# Patient Record
Sex: FEMALE
[De-identification: names, ages, dates, MRNs, and addresses within clinical notes are randomized per-mention and may not be internally consistent; named-entity substitution may affect disease eponyms.]

---

## 2017-06-27 ENCOUNTER — RECORDS - HEALTHEAST (OUTPATIENT)
Dept: ADMINISTRATIVE | Facility: OTHER | Age: 82
End: 2017-06-27

## 2017-08-02 ENCOUNTER — RECORDS - HEALTHEAST (OUTPATIENT)
Dept: ADMINISTRATIVE | Facility: OTHER | Age: 82
End: 2017-08-02

## 2017-08-30 ENCOUNTER — OFFICE VISIT - HEALTHEAST (OUTPATIENT)
Dept: INTERNAL MEDICINE | Facility: CLINIC | Age: 82
End: 2017-08-30

## 2017-08-30 DIAGNOSIS — Z00.00 ROUTINE GENERAL MEDICAL EXAMINATION AT A HEALTH CARE FACILITY: ICD-10-CM

## 2017-08-30 DIAGNOSIS — N83.209 RUPTURED OVARIAN CYST: ICD-10-CM

## 2017-08-30 DIAGNOSIS — N28.89 RENAL MASS, RIGHT: ICD-10-CM

## 2017-08-30 DIAGNOSIS — E78.00 PURE HYPERCHOLESTEROLEMIA: ICD-10-CM

## 2017-08-30 DIAGNOSIS — Z23 NEED FOR VACCINATION: ICD-10-CM

## 2017-08-30 LAB
CHOLEST SERPL-MCNC: 223 MG/DL
FASTING STATUS PATIENT QL REPORTED: YES
HDLC SERPL-MCNC: 70 MG/DL
LDLC SERPL CALC-MCNC: 136 MG/DL
TRIGL SERPL-MCNC: 87 MG/DL

## 2017-08-30 ASSESSMENT — MIFFLIN-ST. JEOR: SCORE: 778.61

## 2017-09-01 ENCOUNTER — RECORDS - HEALTHEAST (OUTPATIENT)
Dept: ADMINISTRATIVE | Facility: OTHER | Age: 82
End: 2017-09-01

## 2017-09-05 ENCOUNTER — COMMUNICATION - HEALTHEAST (OUTPATIENT)
Dept: INTERNAL MEDICINE | Facility: CLINIC | Age: 82
End: 2017-09-05

## 2017-09-11 ENCOUNTER — COMMUNICATION - HEALTHEAST (OUTPATIENT)
Dept: INTERNAL MEDICINE | Facility: CLINIC | Age: 82
End: 2017-09-11

## 2017-09-11 DIAGNOSIS — R10.31 RIGHT LOWER QUADRANT ABDOMINAL PAIN: ICD-10-CM

## 2017-09-11 DIAGNOSIS — N28.89 RENAL MASS, RIGHT: ICD-10-CM

## 2017-09-11 DIAGNOSIS — L30.8 OTHER ECZEMA: ICD-10-CM

## 2017-09-11 DIAGNOSIS — Z00.00 ROUTINE GENERAL MEDICAL EXAMINATION AT A HEALTH CARE FACILITY: ICD-10-CM

## 2017-09-11 DIAGNOSIS — N83.209 RUPTURED OVARIAN CYST: ICD-10-CM

## 2017-09-11 DIAGNOSIS — E78.00 PURE HYPERCHOLESTEROLEMIA: ICD-10-CM

## 2017-09-11 DIAGNOSIS — Z00.00 HEALTHCARE MAINTENANCE: ICD-10-CM

## 2017-10-13 ENCOUNTER — RECORDS - HEALTHEAST (OUTPATIENT)
Dept: ADMINISTRATIVE | Facility: OTHER | Age: 82
End: 2017-10-13

## 2018-08-06 ENCOUNTER — RECORDS - HEALTHEAST (OUTPATIENT)
Dept: ADMINISTRATIVE | Facility: OTHER | Age: 83
End: 2018-08-06

## 2018-08-31 ENCOUNTER — AMBULATORY - HEALTHEAST (OUTPATIENT)
Dept: INTERNAL MEDICINE | Facility: CLINIC | Age: 83
End: 2018-08-31

## 2018-09-04 ENCOUNTER — COMMUNICATION - HEALTHEAST (OUTPATIENT)
Dept: INTERNAL MEDICINE | Facility: CLINIC | Age: 83
End: 2018-09-04

## 2018-09-04 ENCOUNTER — OFFICE VISIT - HEALTHEAST (OUTPATIENT)
Dept: INTERNAL MEDICINE | Facility: CLINIC | Age: 83
End: 2018-09-04

## 2018-09-04 DIAGNOSIS — N28.89 RENAL MASS, RIGHT: ICD-10-CM

## 2018-09-04 DIAGNOSIS — Z00.00 ROUTINE GENERAL MEDICAL EXAMINATION AT A HEALTH CARE FACILITY: ICD-10-CM

## 2018-09-04 DIAGNOSIS — L30.8 OTHER ECZEMA: ICD-10-CM

## 2018-09-04 DIAGNOSIS — Z00.00 HEALTHCARE MAINTENANCE: ICD-10-CM

## 2018-09-04 DIAGNOSIS — R10.31 RIGHT LOWER QUADRANT ABDOMINAL PAIN: ICD-10-CM

## 2018-09-04 DIAGNOSIS — E78.00 PURE HYPERCHOLESTEROLEMIA: ICD-10-CM

## 2018-09-04 DIAGNOSIS — N83.209 RUPTURED OVARIAN CYST: ICD-10-CM

## 2018-09-04 LAB
ALBUMIN SERPL-MCNC: 3.7 G/DL (ref 3.5–5)
ALBUMIN UR-MCNC: NEGATIVE MG/DL
ALP SERPL-CCNC: 54 U/L (ref 45–120)
ALT SERPL W P-5'-P-CCNC: 23 U/L (ref 0–45)
ANION GAP SERPL CALCULATED.3IONS-SCNC: 7 MMOL/L (ref 5–18)
APPEARANCE UR: CLEAR
AST SERPL W P-5'-P-CCNC: 32 U/L (ref 0–40)
ATRIAL RATE - MUSE: 56 BPM
BASOPHILS # BLD AUTO: 0 THOU/UL (ref 0–0.2)
BASOPHILS NFR BLD AUTO: 1 % (ref 0–2)
BILIRUB SERPL-MCNC: 0.8 MG/DL (ref 0–1)
BILIRUB UR QL STRIP: NEGATIVE
BUN SERPL-MCNC: 19 MG/DL (ref 8–28)
CALCIUM SERPL-MCNC: 9.7 MG/DL (ref 8.5–10.5)
CHLORIDE BLD-SCNC: 102 MMOL/L (ref 98–107)
CHOLEST SERPL-MCNC: 235 MG/DL
CO2 SERPL-SCNC: 28 MMOL/L (ref 22–31)
COLOR UR AUTO: YELLOW
CREAT SERPL-MCNC: 0.81 MG/DL (ref 0.6–1.1)
DIASTOLIC BLOOD PRESSURE - MUSE: NORMAL MMHG
EOSINOPHIL # BLD AUTO: 0.1 THOU/UL (ref 0–0.4)
EOSINOPHIL NFR BLD AUTO: 1 % (ref 0–6)
ERYTHROCYTE [DISTWIDTH] IN BLOOD BY AUTOMATED COUNT: 12 % (ref 11–14.5)
FASTING STATUS PATIENT QL REPORTED: YES
GFR SERPL CREATININE-BSD FRML MDRD: >60 ML/MIN/1.73M2
GLUCOSE BLD-MCNC: 92 MG/DL (ref 70–125)
GLUCOSE UR STRIP-MCNC: NEGATIVE MG/DL
HCT VFR BLD AUTO: 41.3 % (ref 35–47)
HDLC SERPL-MCNC: 68 MG/DL
HGB BLD-MCNC: 13.8 G/DL (ref 12–16)
HGB UR QL STRIP: NEGATIVE
INTERPRETATION ECG - MUSE: NORMAL
KETONES UR STRIP-MCNC: NEGATIVE MG/DL
LDLC SERPL CALC-MCNC: 140 MG/DL
LEUKOCYTE ESTERASE UR QL STRIP: NEGATIVE
LYMPHOCYTES # BLD AUTO: 1.8 THOU/UL (ref 0.8–4.4)
LYMPHOCYTES NFR BLD AUTO: 32 % (ref 20–40)
MCH RBC QN AUTO: 29.5 PG (ref 27–34)
MCHC RBC AUTO-ENTMCNC: 33.3 G/DL (ref 32–36)
MCV RBC AUTO: 89 FL (ref 80–100)
MONOCYTES # BLD AUTO: 0.5 THOU/UL (ref 0–0.9)
MONOCYTES NFR BLD AUTO: 9 % (ref 2–10)
NEUTROPHILS # BLD AUTO: 3.3 THOU/UL (ref 2–7.7)
NEUTROPHILS NFR BLD AUTO: 58 % (ref 50–70)
NITRATE UR QL: NEGATIVE
P AXIS - MUSE: 70 DEGREES
PH UR STRIP: 7 [PH] (ref 5–8)
PLATELET # BLD AUTO: 213 THOU/UL (ref 140–440)
PMV BLD AUTO: 7.9 FL (ref 7–10)
POTASSIUM BLD-SCNC: 4 MMOL/L (ref 3.5–5)
PR INTERVAL - MUSE: 154 MS
PROT SERPL-MCNC: 6.9 G/DL (ref 6–8)
QRS DURATION - MUSE: 82 MS
QT - MUSE: 446 MS
QTC - MUSE: 430 MS
R AXIS - MUSE: 37 DEGREES
RBC # BLD AUTO: 4.67 MILL/UL (ref 3.8–5.4)
SODIUM SERPL-SCNC: 137 MMOL/L (ref 136–145)
SP GR UR STRIP: 1.01 (ref 1–1.03)
SYSTOLIC BLOOD PRESSURE - MUSE: NORMAL MMHG
T AXIS - MUSE: 47 DEGREES
TRIGL SERPL-MCNC: 134 MG/DL
UROBILINOGEN UR STRIP-ACNC: NORMAL
VENTRICULAR RATE- MUSE: 56 BPM
WBC: 5.8 THOU/UL (ref 4–11)

## 2018-09-04 RX ORDER — CICLOPIROX OLAMINE 7.7 MG/G
CREAM TOPICAL
Qty: 15 G | Refills: 1 | Status: SHIPPED | OUTPATIENT
Start: 2018-09-04

## 2018-09-04 ASSESSMENT — MIFFLIN-ST. JEOR: SCORE: 774.07

## 2019-04-29 ENCOUNTER — OFFICE VISIT - HEALTHEAST (OUTPATIENT)
Dept: INTERNAL MEDICINE | Facility: CLINIC | Age: 84
End: 2019-04-29

## 2019-04-29 DIAGNOSIS — L28.0 NEURODERMATITIS: ICD-10-CM

## 2019-04-29 ASSESSMENT — MIFFLIN-ST. JEOR: SCORE: 796.75

## 2019-08-07 ENCOUNTER — RECORDS - HEALTHEAST (OUTPATIENT)
Dept: ADMINISTRATIVE | Facility: OTHER | Age: 84
End: 2019-08-07

## 2019-09-10 ENCOUNTER — OFFICE VISIT - HEALTHEAST (OUTPATIENT)
Dept: INTERNAL MEDICINE | Facility: CLINIC | Age: 84
End: 2019-09-10

## 2019-09-10 DIAGNOSIS — Z00.00 ROUTINE GENERAL MEDICAL EXAMINATION AT A HEALTH CARE FACILITY: ICD-10-CM

## 2019-09-10 DIAGNOSIS — E78.00 PURE HYPERCHOLESTEROLEMIA: ICD-10-CM

## 2019-09-10 DIAGNOSIS — L20.82 FLEXURAL ECZEMA: ICD-10-CM

## 2019-09-10 DIAGNOSIS — N28.89 RENAL MASS, RIGHT: ICD-10-CM

## 2019-09-10 DIAGNOSIS — L28.0 NEURODERMATITIS: ICD-10-CM

## 2019-09-10 LAB
ALBUMIN SERPL-MCNC: 3.9 G/DL (ref 3.5–5)
ALBUMIN UR-MCNC: ABNORMAL MG/DL
ALP SERPL-CCNC: 65 U/L (ref 45–120)
ALT SERPL W P-5'-P-CCNC: 19 U/L (ref 0–45)
ANION GAP SERPL CALCULATED.3IONS-SCNC: 8 MMOL/L (ref 5–18)
APPEARANCE UR: CLEAR
AST SERPL W P-5'-P-CCNC: 28 U/L (ref 0–40)
BACTERIA #/AREA URNS HPF: ABNORMAL HPF
BASOPHILS # BLD AUTO: 0 THOU/UL (ref 0–0.2)
BASOPHILS NFR BLD AUTO: 1 % (ref 0–2)
BILIRUB SERPL-MCNC: 0.6 MG/DL (ref 0–1)
BILIRUB UR QL STRIP: NEGATIVE
BUN SERPL-MCNC: 17 MG/DL (ref 8–28)
CALCIUM SERPL-MCNC: 9.8 MG/DL (ref 8.5–10.5)
CHLORIDE BLD-SCNC: 101 MMOL/L (ref 98–107)
CHOLEST SERPL-MCNC: 227 MG/DL
CO2 SERPL-SCNC: 28 MMOL/L (ref 22–31)
COLOR UR AUTO: YELLOW
CREAT SERPL-MCNC: 0.84 MG/DL (ref 0.6–1.1)
EOSINOPHIL # BLD AUTO: 0.1 THOU/UL (ref 0–0.4)
EOSINOPHIL NFR BLD AUTO: 1 % (ref 0–6)
ERYTHROCYTE [DISTWIDTH] IN BLOOD BY AUTOMATED COUNT: 11.8 % (ref 11–14.5)
FASTING STATUS PATIENT QL REPORTED: YES
GFR SERPL CREATININE-BSD FRML MDRD: >60 ML/MIN/1.73M2
GLUCOSE BLD-MCNC: 89 MG/DL (ref 70–125)
GLUCOSE UR STRIP-MCNC: NEGATIVE MG/DL
HCT VFR BLD AUTO: 44.6 % (ref 35–47)
HDLC SERPL-MCNC: 77 MG/DL
HGB BLD-MCNC: 14.8 G/DL (ref 12–16)
HGB UR QL STRIP: NEGATIVE
KETONES UR STRIP-MCNC: NEGATIVE MG/DL
LDLC SERPL CALC-MCNC: 129 MG/DL
LEUKOCYTE ESTERASE UR QL STRIP: NEGATIVE
LYMPHOCYTES # BLD AUTO: 2.2 THOU/UL (ref 0.8–4.4)
LYMPHOCYTES NFR BLD AUTO: 30 % (ref 20–40)
MCH RBC QN AUTO: 29.8 PG (ref 27–34)
MCHC RBC AUTO-ENTMCNC: 33.2 G/DL (ref 32–36)
MCV RBC AUTO: 90 FL (ref 80–100)
MONOCYTES # BLD AUTO: 0.6 THOU/UL (ref 0–0.9)
MONOCYTES NFR BLD AUTO: 8 % (ref 2–10)
MUCOUS THREADS #/AREA URNS LPF: ABNORMAL LPF
NEUTROPHILS # BLD AUTO: 4.3 THOU/UL (ref 2–7.7)
NEUTROPHILS NFR BLD AUTO: 60 % (ref 50–70)
NITRATE UR QL: NEGATIVE
PH UR STRIP: 8 [PH] (ref 4.5–8)
PLATELET # BLD AUTO: 246 THOU/UL (ref 140–440)
PMV BLD AUTO: 9.1 FL (ref 7–10)
POTASSIUM BLD-SCNC: 4.3 MMOL/L (ref 3.5–5)
PROT SERPL-MCNC: 7 G/DL (ref 6–8)
RBC # BLD AUTO: 4.96 MILL/UL (ref 3.8–5.4)
RBC #/AREA URNS AUTO: ABNORMAL HPF
SODIUM SERPL-SCNC: 137 MMOL/L (ref 136–145)
SP GR UR STRIP: 1.01 (ref 1–1.03)
SQUAMOUS #/AREA URNS AUTO: ABNORMAL LPF
TRIGL SERPL-MCNC: 106 MG/DL
UROBILINOGEN UR STRIP-ACNC: ABNORMAL
WBC #/AREA URNS AUTO: ABNORMAL HPF
WBC: 7.1 THOU/UL (ref 4–11)

## 2019-09-10 RX ORDER — TRIAMCINOLONE ACETONIDE 5 MG/G
CREAM TOPICAL
Qty: 30 G | Refills: 1 | Status: SHIPPED | OUTPATIENT
Start: 2019-09-10

## 2019-09-10 ASSESSMENT — MIFFLIN-ST. JEOR: SCORE: 774.07

## 2019-09-11 ENCOUNTER — COMMUNICATION - HEALTHEAST (OUTPATIENT)
Dept: INTERNAL MEDICINE | Facility: CLINIC | Age: 84
End: 2019-09-11

## 2020-06-19 ENCOUNTER — RECORDS - HEALTHEAST (OUTPATIENT)
Dept: ADMINISTRATIVE | Facility: OTHER | Age: 85
End: 2020-06-19

## 2020-07-01 ENCOUNTER — RECORDS - HEALTHEAST (OUTPATIENT)
Dept: ADMINISTRATIVE | Facility: OTHER | Age: 85
End: 2020-07-01

## 2020-07-17 ENCOUNTER — RECORDS - HEALTHEAST (OUTPATIENT)
Dept: ADMINISTRATIVE | Facility: OTHER | Age: 85
End: 2020-07-17

## 2020-07-29 ENCOUNTER — RECORDS - HEALTHEAST (OUTPATIENT)
Dept: ADMINISTRATIVE | Facility: OTHER | Age: 85
End: 2020-07-29

## 2020-07-31 ENCOUNTER — COMMUNICATION - HEALTHEAST (OUTPATIENT)
Dept: INTERNAL MEDICINE | Facility: CLINIC | Age: 85
End: 2020-07-31

## 2020-08-05 ENCOUNTER — OFFICE VISIT - HEALTHEAST (OUTPATIENT)
Dept: INTERNAL MEDICINE | Facility: CLINIC | Age: 85
End: 2020-08-05

## 2020-08-05 DIAGNOSIS — Z01.818 PREOP GENERAL PHYSICAL EXAM: ICD-10-CM

## 2020-08-05 DIAGNOSIS — Z91.89 AT RISK FOR DECREASED BONE DENSITY: ICD-10-CM

## 2020-08-05 LAB
ANION GAP SERPL CALCULATED.3IONS-SCNC: 9 MMOL/L (ref 5–18)
ATRIAL RATE - MUSE: 78 BPM
BUN SERPL-MCNC: 21 MG/DL (ref 8–28)
CALCIUM SERPL-MCNC: 9.8 MG/DL (ref 8.5–10.5)
CHLORIDE BLD-SCNC: 98 MMOL/L (ref 98–107)
CO2 SERPL-SCNC: 27 MMOL/L (ref 22–31)
CREAT SERPL-MCNC: 0.8 MG/DL (ref 0.6–1.1)
DIASTOLIC BLOOD PRESSURE - MUSE: NORMAL
ERYTHROCYTE [DISTWIDTH] IN BLOOD BY AUTOMATED COUNT: 12.1 % (ref 11–14.5)
GFR SERPL CREATININE-BSD FRML MDRD: >60 ML/MIN/1.73M2
GLUCOSE BLD-MCNC: 95 MG/DL (ref 70–125)
HCT VFR BLD AUTO: 42.2 % (ref 35–47)
HGB BLD-MCNC: 14 G/DL (ref 12–16)
INTERPRETATION ECG - MUSE: NORMAL
MCH RBC QN AUTO: 29.5 PG (ref 27–34)
MCHC RBC AUTO-ENTMCNC: 33.2 G/DL (ref 32–36)
MCV RBC AUTO: 89 FL (ref 80–100)
P AXIS - MUSE: 60 DEGREES
PLATELET # BLD AUTO: 251 THOU/UL (ref 140–440)
PMV BLD AUTO: 7.3 FL (ref 7–10)
POTASSIUM BLD-SCNC: 4.1 MMOL/L (ref 3.5–5)
PR INTERVAL - MUSE: 148 MS
QRS DURATION - MUSE: 78 MS
QT - MUSE: 378 MS
QTC - MUSE: 430 MS
R AXIS - MUSE: 27 DEGREES
RBC # BLD AUTO: 4.74 MILL/UL (ref 3.8–5.4)
SODIUM SERPL-SCNC: 134 MMOL/L (ref 136–145)
SYSTOLIC BLOOD PRESSURE - MUSE: NORMAL
T AXIS - MUSE: 49 DEGREES
VENTRICULAR RATE- MUSE: 78 BPM
WBC: 6.7 THOU/UL (ref 4–11)

## 2020-08-07 ENCOUNTER — RECORDS - HEALTHEAST (OUTPATIENT)
Dept: ADMINISTRATIVE | Facility: OTHER | Age: 85
End: 2020-08-07

## 2020-08-09 ENCOUNTER — AMBULATORY - HEALTHEAST (OUTPATIENT)
Dept: FAMILY MEDICINE | Facility: CLINIC | Age: 85
End: 2020-08-09

## 2020-08-09 DIAGNOSIS — Z20.822 ENCOUNTER FOR LABORATORY TESTING FOR COVID-19 VIRUS: ICD-10-CM

## 2020-08-11 ENCOUNTER — COMMUNICATION - HEALTHEAST (OUTPATIENT)
Dept: SCHEDULING | Facility: CLINIC | Age: 85
End: 2020-08-11

## 2020-08-12 ENCOUNTER — RECORDS - HEALTHEAST (OUTPATIENT)
Dept: ADMINISTRATIVE | Facility: OTHER | Age: 85
End: 2020-08-12

## 2020-08-12 ENCOUNTER — COMMUNICATION - HEALTHEAST (OUTPATIENT)
Dept: SCHEDULING | Facility: CLINIC | Age: 85
End: 2020-08-12

## 2020-08-21 ENCOUNTER — RECORDS - HEALTHEAST (OUTPATIENT)
Dept: ADMINISTRATIVE | Facility: OTHER | Age: 85
End: 2020-08-21

## 2020-09-14 ENCOUNTER — OFFICE VISIT - HEALTHEAST (OUTPATIENT)
Dept: INTERNAL MEDICINE | Facility: CLINIC | Age: 85
End: 2020-09-14

## 2020-09-14 DIAGNOSIS — M23.204 OLD PERIPHERAL TEAR OF MEDIAL MENISCUS OF LEFT KNEE: ICD-10-CM

## 2020-09-14 DIAGNOSIS — E78.00 PURE HYPERCHOLESTEROLEMIA: ICD-10-CM

## 2020-09-14 DIAGNOSIS — Z00.00 ROUTINE GENERAL MEDICAL EXAMINATION AT A HEALTH CARE FACILITY: ICD-10-CM

## 2020-09-14 LAB
ALBUMIN SERPL-MCNC: 3.8 G/DL (ref 3.5–5)
ALBUMIN UR-MCNC: NEGATIVE MG/DL
ALP SERPL-CCNC: 64 U/L (ref 45–120)
ALT SERPL W P-5'-P-CCNC: 29 U/L (ref 0–45)
ANION GAP SERPL CALCULATED.3IONS-SCNC: 7 MMOL/L (ref 5–18)
APPEARANCE UR: CLEAR
AST SERPL W P-5'-P-CCNC: 26 U/L (ref 0–40)
BASOPHILS # BLD AUTO: 0 THOU/UL (ref 0–0.2)
BASOPHILS NFR BLD AUTO: 1 % (ref 0–2)
BILIRUB SERPL-MCNC: 0.6 MG/DL (ref 0–1)
BILIRUB UR QL STRIP: NEGATIVE
BUN SERPL-MCNC: 19 MG/DL (ref 8–28)
CALCIUM SERPL-MCNC: 9.8 MG/DL (ref 8.5–10.5)
CHLORIDE BLD-SCNC: 101 MMOL/L (ref 98–107)
CHOLEST SERPL-MCNC: 243 MG/DL
CO2 SERPL-SCNC: 27 MMOL/L (ref 22–31)
COLOR UR AUTO: YELLOW
CREAT SERPL-MCNC: 0.75 MG/DL (ref 0.6–1.1)
EOSINOPHIL # BLD AUTO: 0.1 THOU/UL (ref 0–0.4)
EOSINOPHIL NFR BLD AUTO: 1 % (ref 0–6)
ERYTHROCYTE [DISTWIDTH] IN BLOOD BY AUTOMATED COUNT: 12.3 % (ref 11–14.5)
FASTING STATUS PATIENT QL REPORTED: YES
GFR SERPL CREATININE-BSD FRML MDRD: >60 ML/MIN/1.73M2
GLUCOSE BLD-MCNC: 93 MG/DL (ref 70–125)
GLUCOSE UR STRIP-MCNC: NEGATIVE MG/DL
HCT VFR BLD AUTO: 40.7 % (ref 35–47)
HDLC SERPL-MCNC: 75 MG/DL
HGB BLD-MCNC: 13.6 G/DL (ref 12–16)
HGB UR QL STRIP: NEGATIVE
KETONES UR STRIP-MCNC: NEGATIVE MG/DL
LDLC SERPL CALC-MCNC: 150 MG/DL
LEUKOCYTE ESTERASE UR QL STRIP: NEGATIVE
LYMPHOCYTES # BLD AUTO: 2 THOU/UL (ref 0.8–4.4)
LYMPHOCYTES NFR BLD AUTO: 26 % (ref 20–40)
MCH RBC QN AUTO: 29.7 PG (ref 27–34)
MCHC RBC AUTO-ENTMCNC: 33.4 G/DL (ref 32–36)
MCV RBC AUTO: 89 FL (ref 80–100)
MONOCYTES # BLD AUTO: 0.6 THOU/UL (ref 0–0.9)
MONOCYTES NFR BLD AUTO: 8 % (ref 2–10)
NEUTROPHILS # BLD AUTO: 4.9 THOU/UL (ref 2–7.7)
NEUTROPHILS NFR BLD AUTO: 65 % (ref 50–70)
NITRATE UR QL: NEGATIVE
PH UR STRIP: 7 [PH] (ref 5–8)
PLATELET # BLD AUTO: 275 THOU/UL (ref 140–440)
PMV BLD AUTO: 7.9 FL (ref 7–10)
POTASSIUM BLD-SCNC: 4.5 MMOL/L (ref 3.5–5)
PROT SERPL-MCNC: 6.5 G/DL (ref 6–8)
RBC # BLD AUTO: 4.58 MILL/UL (ref 3.8–5.4)
SODIUM SERPL-SCNC: 135 MMOL/L (ref 136–145)
SP GR UR STRIP: 1.02 (ref 1–1.03)
TRIGL SERPL-MCNC: 91 MG/DL
UROBILINOGEN UR STRIP-ACNC: NORMAL
WBC: 7.5 THOU/UL (ref 4–11)

## 2020-09-14 ASSESSMENT — MIFFLIN-ST. JEOR: SCORE: 792.22

## 2020-09-15 ENCOUNTER — COMMUNICATION - HEALTHEAST (OUTPATIENT)
Dept: INTERNAL MEDICINE | Facility: CLINIC | Age: 85
End: 2020-09-15

## 2020-09-30 ENCOUNTER — RECORDS - HEALTHEAST (OUTPATIENT)
Dept: ADMINISTRATIVE | Facility: OTHER | Age: 85
End: 2020-09-30

## 2020-10-19 ENCOUNTER — OFFICE VISIT - HEALTHEAST (OUTPATIENT)
Dept: INTERNAL MEDICINE | Facility: CLINIC | Age: 85
End: 2020-10-19

## 2020-10-19 DIAGNOSIS — L30.9 ECZEMA, UNSPECIFIED TYPE: ICD-10-CM

## 2020-10-19 DIAGNOSIS — Z00.00 ROUTINE GENERAL MEDICAL EXAMINATION AT A HEALTH CARE FACILITY: ICD-10-CM

## 2020-10-19 DIAGNOSIS — N28.89 RENAL MASS, RIGHT: ICD-10-CM

## 2020-10-19 RX ORDER — TRIAMCINOLONE ACETONIDE 1 MG/G
OINTMENT TOPICAL
Qty: 30 G | Refills: 1 | Status: SHIPPED | OUTPATIENT
Start: 2020-10-19

## 2020-10-19 RX ORDER — MELOXICAM 7.5 MG/1
TABLET ORAL
Status: SHIPPED | COMMUNITY
Start: 2020-09-30

## 2020-10-19 ASSESSMENT — MIFFLIN-ST. JEOR: SCORE: 792.22

## 2020-12-01 ENCOUNTER — AMBULATORY - HEALTHEAST (OUTPATIENT)
Dept: INTERVENTIONAL RADIOLOGY/VASCULAR | Facility: HOSPITAL | Age: 85
End: 2020-12-01

## 2020-12-01 ENCOUNTER — RECORDS - HEALTHEAST (OUTPATIENT)
Dept: ADMINISTRATIVE | Facility: OTHER | Age: 85
End: 2020-12-01

## 2021-03-10 ENCOUNTER — AMBULATORY - HEALTHEAST (OUTPATIENT)
Dept: NURSING | Facility: CLINIC | Age: 86
End: 2021-03-10

## 2021-03-31 ENCOUNTER — AMBULATORY - HEALTHEAST (OUTPATIENT)
Dept: NURSING | Facility: CLINIC | Age: 86
End: 2021-03-31

## 2021-05-28 NOTE — PROGRESS NOTES
"OFFICE VISIT NOTE    Subjective:   Chief Complaint:  Rash (right shoulder blade and arms)    83-year-old woman is in with a complaint of itchy rash on the back of her neck as well as on her forearms.  No fevers or chills.  No blisters.    Current Outpatient Medications   Medication Sig     calcium-vitamin D (CALCIUM-VITAMIN D) 500 mg(1,250mg) -200 unit per tablet Take 1 tablet by mouth 2 (two) times a day with meals.     ciclopirox (LOPROX, AS OLAMINE,) 0.77 % cream Gently massage into affected nail areas and surrounding skin     triamcinolone (KENALOG) 0.1 % ointment APPLY EXTERNALLY TO THE AFFECTED AREA TWICE DAILY     triamcinolone (KENALOG) 0.5 % cream Apply to rash twice daily       Review of Systems:  A comprehensive review of systems is negative except for the comments above    Objective:    Ht 4' 8.5\" (1.435 m)   Wt 106 lb (48.1 kg)   BMI 23.35 kg/m    GENERAL: No acute distress.  Has an eczema-like rash on the back of her neck and right shoulder.  There is secondary excoriations.  The rash is dry and red and scabs are present from  scratching  1 or 2 red papules seen on the right forearm and left forearm  Assessment & Plan   Neetu Bird is a 83 y.o. female.    This seems like eczema or neurodermatitis.  Will try triamcinolone cream 0.5% twice daily.  I told her not to scratch the area as it makes it worse.  I would expect to see significant improvement in 3 or 4 days.    Diagnoses and all orders for this visit:    Neurodermatitis  -     triamcinolone (KENALOG) 0.5 % cream; Apply to rash twice daily  Dispense: 30 g; Refill: 1        Antonio Coyne MD  Transcription using voice recognition software, may contain typographical errors.    "

## 2021-05-30 ENCOUNTER — RECORDS - HEALTHEAST (OUTPATIENT)
Dept: ADMINISTRATIVE | Facility: CLINIC | Age: 86
End: 2021-05-30

## 2021-05-31 ENCOUNTER — RECORDS - HEALTHEAST (OUTPATIENT)
Dept: ADMINISTRATIVE | Facility: CLINIC | Age: 86
End: 2021-05-31

## 2021-05-31 VITALS — BODY MASS INDEX: 22.01 KG/M2 | WEIGHT: 102 LBS | HEIGHT: 57 IN

## 2021-06-01 NOTE — PROGRESS NOTES
Assessment and Plan:   84-year-old woman in for annual well visit and exam.  This is an amazing woman.  She still runs marathons at 84.  She won a  gold Medal at the senior games last year running.    1. Routine general medical examination at a health care facility  Normal exam for this 84-year-old woman.  Superb health.    2. Pure hypercholesterolemia  No cardiovascular problems.    3. Renal mass, right  Sees a kidney doctor every 6 months.  This mass is benign.    4. Flexural eczema  Triamcinolone cream 0.5%    5. Neurodermatitis  Use triamcinolone as well        The patient's current medical problems were reviewed.    I have had an Advance Directives discussion with the patient.  The following health maintenance schedule was reviewed with the patient and provided in printed form in the after visit summary:   Health Maintenance   Topic Date Due     ZOSTER VACCINES (1 of 2) 05/21/1987     DXA SCAN  05/21/2002     INFLUENZA VACCINE RULE BASED (1) 08/01/2019     FALL RISK ASSESSMENT  09/04/2019     MEDICARE ANNUAL WELLNESS VISIT  09/04/2019     TD 18+ HE  07/20/2021     ADVANCE CARE PLANNING  09/04/2023     PNEUMOCOCCAL POLYSACCHARIDE VACCINE AGE 65 AND OVER  Completed     PNEUMOCOCCAL CONJUGATE VACCINE FOR ADULTS (PCV13 OR PREVNAR)  Completed        Subjective:   Chief Complaint: Neetu Brid is an 84 y.o. female here for an Annual Wellness visit.   HPI: 84-year-old woman for annual wellness visit.  12 point system review is totally negative.  She has minor itching of the back of the neck and the flexural creases of the arms related to eczema.  Everything else negative.  No cardiopulmonary symptoms.    Review of Systems:    Please see above.  The rest of the review of systems are negative for all systems.    Patient Care Team:  Antonio Coyne MD as PCP - General (Internal Medicine)  Antonio Coyne MD as Assigned PCP     Patient Active Problem List   Diagnosis     Pure hypercholesterolemia     Renal mass,  right     Ruptured ovarian cyst     Past Medical History:   Diagnosis Date     Elevated cholesterol     no vascular problems      Past Surgical History:   Procedure Laterality Date     OVARY SURGERY      fibroma     TONSILLECTOMY AND ADENOIDECTOMY        Family History   Problem Relation Age of Onset     Other Mother         Passed in a MVA     Other Father         passed from old age      Social History     Socioeconomic History     Marital status:      Spouse name: Not on file     Number of children: Not on file     Years of education: Not on file     Highest education level: Not on file   Occupational History     Not on file   Social Needs     Financial resource strain: Not on file     Food insecurity:     Worry: Not on file     Inability: Not on file     Transportation needs:     Medical: Not on file     Non-medical: Not on file   Tobacco Use     Smoking status: Never Smoker     Smokeless tobacco: Never Used   Substance and Sexual Activity     Alcohol use: Not on file     Drug use: Not on file     Sexual activity: Not on file   Lifestyle     Physical activity:     Days per week: Not on file     Minutes per session: Not on file     Stress: Not on file   Relationships     Social connections:     Talks on phone: Not on file     Gets together: Not on file     Attends Buddhist service: Not on file     Active member of club or organization: Not on file     Attends meetings of clubs or organizations: Not on file     Relationship status: Not on file     Intimate partner violence:     Fear of current or ex partner: Not on file     Emotionally abused: Not on file     Physically abused: Not on file     Forced sexual activity: Not on file   Other Topics Concern     Not on file   Social History Narrative    , 1 son      Current Outpatient Medications   Medication Sig Dispense Refill     calcium-vitamin D (CALCIUM-VITAMIN D) 500 mg(1,250mg) -200 unit per tablet Take 1 tablet by mouth 2 (two) times a day with  "meals.       ciclopirox (LOPROX, AS OLAMINE,) 0.77 % cream Gently massage into affected nail areas and surrounding skin 15 g 1     triamcinolone (KENALOG) 0.1 % ointment APPLY EXTERNALLY TO THE AFFECTED AREA TWICE DAILY 30 g 1     triamcinolone (KENALOG) 0.5 % cream Apply to rash twice daily 30 g 1     No current facility-administered medications for this visit.       Objective:   Vital Signs:   Visit Vitals  /70   Pulse 92   Ht 4' 8.5\" (1.435 m)   Wt 101 lb (45.8 kg)   SpO2 97%   BMI 22.24 kg/m         VisionScreening:  No exam data present     PHYSICAL EXAM  Pleasant very healthy-appearing woman who is appears younger than 84.  Blood pressure 128/76.  Pulse 60 and regular.  EYES: Eyelids, conjunctiva, and sclera were normal. Pupils were normal. Cornea, iris, and lens were normal bilaterally.  HEAD, EARS, NOSE, MOUTH, AND THROAT: Head and face were normal. Hearing was normal to voice and the ears were normal to external exam. Nose appearance was normal and there was no discharge. Oropharynx was normal.  NECK: Neck appearance was normal. There were no neck masses and the thyroid was not enlarged.  RESPIRATORY: Breathing pattern was normal and the chest moved symmetrically.  Percussion/auscultatory percussion was normal.  Lung sounds were normal and there were no abnormal sounds.  CARDIOVASCULAR: Heart rate and rhythm were normal.  S1 and S2 were normal and there were no extra sounds or murmurs. Peripheral pulses in arms and legs were normal.  Jugular venous pressure was normal.  There was no peripheral edema.  GASTROINTESTINAL: The abdomen was normal in contour.  Bowel sounds were present.  Percussion detected no organ enlargement or tenderness.  Palpation detected no tenderness, mass, or enlarged organs.   MUSCULOSKELETAL: Skeletal configuration was normal and muscle mass was normal for age. Joint appearance was overall normal.  LYMPHATIC: There were no enlarged nodes.  SKIN/HAIR/NAILS: Skin color was normal. "  There were no skin lesions.  Hair and nails were normal.  NEUROLOGIC: The patient was alert and oriented to person, place, time, and circumstance. Speech was normal. Cranial nerves were normal. Motor strength was normal for age. The patient was normally coordinated.  PSYCHIATRIC:  Mood and affect were normal and the patient had normal recent and remote memory. The patient's judgment and insight were normal.    ADDITIONAL VITAL SIGNS: Pulse 60 and regular  CHEST WALL/BREASTS: Normal female no masses  RECTAL: Not checked  GENITAL/URINARY: Not checked this year.        Assessment Results 9/10/2019   Activities of Daily Living No help needed   Instrumental Activities of Daily Living No help needed   Get Up and Go Score Less than 12 seconds   Mini Cog Total Score 5   Some recent data might be hidden     A Mini-Cog score of 0-2 suggests the possibility of dementia, score of 3-5 suggests no dementia    Identified Health Risks:     Patient's advanced directive was discussed and I am comfortable with the patient's wishes.

## 2021-06-01 NOTE — PATIENT INSTRUCTIONS - HE
Advance Directive  Patient s advance directive was discussed and I am comfortable with the patient s wishes.  Patient Education   Personalized Prevention Plan  You are due for the preventive services outlined below.  Your care team is available to assist you in scheduling these services.  If you have already completed any of these items, please share that information with your care team to update in your medical record.  Health Maintenance   Topic Date Due     ZOSTER VACCINES (1 of 2) 05/21/1987     DXA SCAN  05/21/2002     INFLUENZA VACCINE RULE BASED (1) 08/01/2019     FALL RISK ASSESSMENT  09/04/2019     MEDICARE ANNUAL WELLNESS VISIT  09/04/2019     TD 18+ HE  07/20/2021     ADVANCE CARE PLANNING  09/04/2023     PNEUMOCOCCAL POLYSACCHARIDE VACCINE AGE 65 AND OVER  Completed     PNEUMOCOCCAL CONJUGATE VACCINE FOR ADULTS (PCV13 OR PREVNAR)  Completed

## 2021-06-02 VITALS — BODY MASS INDEX: 21.79 KG/M2 | WEIGHT: 101 LBS | HEIGHT: 57 IN

## 2021-06-03 ENCOUNTER — RECORDS - HEALTHEAST (OUTPATIENT)
Dept: ADMINISTRATIVE | Facility: OTHER | Age: 86
End: 2021-06-03

## 2021-06-03 VITALS
OXYGEN SATURATION: 97 % | HEIGHT: 57 IN | SYSTOLIC BLOOD PRESSURE: 126 MMHG | WEIGHT: 101 LBS | BODY MASS INDEX: 21.79 KG/M2 | HEART RATE: 92 BPM | DIASTOLIC BLOOD PRESSURE: 70 MMHG

## 2021-06-03 VITALS — HEIGHT: 57 IN | WEIGHT: 106 LBS | BODY MASS INDEX: 22.87 KG/M2

## 2021-06-04 VITALS
DIASTOLIC BLOOD PRESSURE: 47 MMHG | HEART RATE: 95 BPM | WEIGHT: 106 LBS | OXYGEN SATURATION: 96 % | SYSTOLIC BLOOD PRESSURE: 122 MMHG | BODY MASS INDEX: 23.35 KG/M2

## 2021-06-05 VITALS
DIASTOLIC BLOOD PRESSURE: 76 MMHG | HEIGHT: 57 IN | SYSTOLIC BLOOD PRESSURE: 128 MMHG | WEIGHT: 105 LBS | BODY MASS INDEX: 22.65 KG/M2

## 2021-06-05 VITALS
OXYGEN SATURATION: 99 % | BODY MASS INDEX: 22.65 KG/M2 | HEIGHT: 57 IN | HEART RATE: 84 BPM | WEIGHT: 105 LBS | SYSTOLIC BLOOD PRESSURE: 128 MMHG | DIASTOLIC BLOOD PRESSURE: 76 MMHG

## 2021-06-10 NOTE — TELEPHONE ENCOUNTER
Who is calling:  Neetu   Pt need a pre op exam. Knee Surgery Aug 12 with Dr Katharine Carrasco.  Reason for Call:    Date of last appointment with primary care:   Okay to leave a detailed message: Yes

## 2021-06-10 NOTE — TELEPHONE ENCOUNTER
Spoke with the patient and helped her to schedule a pre-op with Dr. Aggarwal on 8/5/2020.  Patient verbalized understanding and had no further questions at this time.  Aure RIGGINS CMA/SHEILA....................8:36 AM

## 2021-06-10 NOTE — PROGRESS NOTES
Preoperative Exam    Scheduled Procedure: LT knee surgery  Surgery Date:  8/12/20  Surgery Location: [facility], fax [phone]  [place]  Surgeon:  Dr. Alcantar    Assessment/Plan:     1. Preop general physical exam  Low risk surgery with complete unremarkable cardiopulmonary evaluation.  Low risk surgery and labs are completely stable.  Would probably avoid ibuprofen/NSAIDs as possibly patient may have an allergy to it in the form of rash  - Electrocardiogram Perform and Read  - HM2(CBC w/o Differential)  - Basic Metabolic Panel    2. At risk for decreased bone density  - DXA Bone Density Scan; Future    Surgical Procedure Risk: Low (reported cardiac risk generally < 1%)  Have you had prior anesthesia?: Yes  Have you or any family members had a previous anesthesia reaction:  No  Do you or any family members have a history of a clotting or bleeding disorder?: No  Cardiac Risk Assessment: no increased risk for major cardiac complications    APPROVAL GIVEN to proceed with proposed procedure, without further diagnostic evaluation    Functional Status: Independent  Patient plans to recover at home with family.     Subjective:      Neetu Bird is a 85 y.o. female who presents for a preoperative consultation. Was taking Advil for pain, and developed a rash on her arms. Will have left knee surgery - meniscal repair. Not taking anything for pain. Is using Ice therapy. She can take tylenol/acetaminophen. She does not drink alcohol. Son will be taking her to the surgery. Never had a MI/CVA. No recent CP, f/s/c, shortness of breath, cough, n/v, dysuria. CBC, CMP from 9/2019. Will have covid19 testing on 8/9/20.      All other systems reviewed and are negative, other than those listed in the HPI.    Pertinent History  Do you have difficulty breathing or chest pain after walking up a flight of stairs: No  History of obstructive sleep apnea: No  Steroid use in the last 6 months: No  Frequent  Aspirin/NSAID use: No  Prior Blood Transfusion: No  Prior Blood Transfusion Reaction: No  If for some reason prior to, during or after the procedure, if it is medically indicated, would you be willing to have a blood transfusion?:  There is no transfusion refusal.    Current Outpatient Medications   Medication Sig Dispense Refill     calcium-vitamin D (CALCIUM-VITAMIN D) 500 mg(1,250mg) -200 unit per tablet Take 1 tablet by mouth 2 (two) times a day with meals.       ciclopirox (LOPROX, AS OLAMINE,) 0.77 % cream Gently massage into affected nail areas and surrounding skin 15 g 1     triamcinolone (KENALOG) 0.1 % ointment APPLY EXTERNALLY TO THE AFFECTED AREA TWICE DAILY 30 g 1     triamcinolone (KENALOG) 0.5 % cream Apply to rash twice daily 30 g 1     No current facility-administered medications for this visit.       No Known Allergies    Patient Active Problem List   Diagnosis     Pure hypercholesterolemia     Renal mass, right     Ruptured ovarian cyst     Past Medical History:   Diagnosis Date     Pure hypercholesterolemia 8/29/2016     Ruptured ovarian cyst 8/29/2016     Past Surgical History:   Procedure Laterality Date     OVARY SURGERY      fibroma     TONSILLECTOMY AND ADENOIDECTOMY       Socioeconomic History     Marital status:      Smoking status: Never Smoker     Smokeless tobacco: Never Used   Social History Narrative    , 1 son     Patient Care Team:  Antonio Coyne MD as PCP - General (Internal Medicine)  Antonio Coyne MD as Assigned PCP    Objective:     Vitals:    08/05/20 0928   BP: 122/47   Pulse: 95   SpO2: 96%   Weight: 106 lb (48.1 kg)   Temp:       98.7    Physical Exam:  GENERAL APPEARANCE: Pleasant, nontoxic-appearing, no acute distress   EYES: Eyelids, conjunctiva, and sclera were normal.    NECK: Neck appearance was normal.    RESPIRATORY: Bilaterally with no crackles, wheezing or rhonchi  CARDIOVASCULAR: Regular S1 and S2.  Radial pulses intact.  No  edema.  GASTROINTESTINAL: NABS. Soft. No organ enlargement or tenderness.  No tenderness, mass, or enlarged organs.   MUSCULOSKELETAL: Skeletal configuration was normal and muscle mass was normal for age.   SKIN/HAIR/NAILS: Skin color was normal.  Resolving raised papular rash in the arms.  NEUROLOGIC: Alert and oriented x4. Speech was normal. Motor strength was normal for age.  No focal deficits    Patient Instructions   Do not take NSAIDs, they are not good for older adults, claus for the heart, kidneys and stomach  You can take acetmainophen for your pain. Do not take more than 3000 mg in a 24 hour period and more than 1000 mg at one time. For instance, you can take 1000 or 975 mg three times a day as needed.      EKG:  HR 78, NSR, no ST/T wave changes, QTc 430 ms.     Labs:  Recent Results (from the past 24 hour(s))   Electrocardiogram Perform and Read    Collection Time: 08/05/20  9:37 AM   Result Value Ref Range    SYSTOLIC BLOOD PRESSURE      DIASTOLIC BLOOD PRESSURE      VENTRICULAR RATE 78 BPM    ATRIAL RATE 78 BPM    P-R INTERVAL 148 ms    QRS DURATION 78 ms    Q-T INTERVAL 378 ms    QTC CALCULATION (BEZET) 430 ms    P Axis 60 degrees    R AXIS 27 degrees    T AXIS 49 degrees    MUSE DIAGNOSIS       Normal sinus rhythm  Nonspecific ST abnormality  Abnormal ECG  When compared with ECG of 04-SEP-2018 08:05,  No significant change was found  Confirmed by KAREN RAMIREZ MD LOC: ([id number]) on 8/5/2020 2:53:58 PM     HM2(CBC w/o Differential)    Collection Time: 08/05/20 10:36 AM   Result Value Ref Range    WBC 6.7 4.0 - 11.0 thou/uL    RBC 4.74 3.80 - 5.40 mill/uL    Hemoglobin 14.0 12.0 - 16.0 g/dL    Hematocrit 42.2 35.0 - 47.0 %    MCV 89 80 - 100 fL    MCH 29.5 27.0 - 34.0 pg    MCHC 33.2 32.0 - 36.0 g/dL    RDW 12.1 11.0 - 14.5 %    Platelets 251 140 - 440 thou/uL    MPV 7.3 7.0 - 10.0 fL   Basic Metabolic Panel    Collection Time: 08/05/20 10:36 AM   Result Value Ref Range    Sodium 134 (L) 136 - 145  mmol/L    Potassium 4.1 3.5 - 5.0 mmol/L    Chloride 98 98 - 107 mmol/L    CO2 27 22 - 31 mmol/L    Anion Gap, Calculation 9 5 - 18 mmol/L    Glucose 95 70 - 125 mg/dL    Calcium 9.8 8.5 - 10.5 mg/dL    BUN 21 8 - 28 mg/dL    Creatinine 0.80 0.60 - 1.10 mg/dL    GFR MDRD Af Amer >60 >60 mL/min/1.73m2    GFR MDRD Non Af Amer >60 >60 mL/min/1.73m2     Immunization History   Administered Date(s) Administered     Influenza, inj, historic,unspecified 10/01/2018     Pneumo Conj 13-V (2010&after) 08/30/2017     Pneumo Polysac 23-V 07/20/2011     Td,adult,historic,unspecified 07/20/2011   Electronically signed by Angel Aggarwal MD 08/05/20 9:30 AM

## 2021-06-10 NOTE — PATIENT INSTRUCTIONS - HE
Do not take NSAIDs, they are not good for older adults, claus for the heart, kidneys and stomach  You can take acetmainophen for your pain. Do not take more than 3000 mg in a 24 hour period and more than 1000 mg at one time. For instance, you can take 1000 or 975 mg three times a day as needed.

## 2021-06-11 NOTE — PATIENT INSTRUCTIONS - HE
Advance Directive  Patient s advance directive was discussed and I am comfortable with the patient s wishes.  Patient Education   Personalized Prevention Plan  You are due for the preventive services outlined below.  Your care team is available to assist you in scheduling these services.  If you have already completed any of these items, please share that information with your care team to update in your medical record.  Health Maintenance   Topic Date Due     ZOSTER VACCINES (1 of 2) 05/21/1987     DXA SCAN  05/21/2002     INFLUENZA VACCINE RULE BASED (1) 08/01/2020     MEDICARE ANNUAL WELLNESS VISIT  09/10/2020     TD 18+ HE  07/20/2021     FALL RISK ASSESSMENT  08/05/2021     ADVANCE CARE PLANNING  09/10/2024     PNEUMOCOCCAL IMMUNIZATION 65+ LOW/MEDIUM RISK  Completed     HEPATITIS B VACCINES  Aged Out

## 2021-06-11 NOTE — PROGRESS NOTES
Assessment and Plan:   85-year-old woman in for annual wellness visit and exam.  She had recent left knee surgery for medial meniscus tear.  Surgery went well and she currently feels well.  She is quite active.    1. Routine general medical examination at a health care facility  Normal examination.  She still runs 5 to 6 miles 3 or 4 times per week.  Flu shot is given today.  2. Pure hypercholesterolemia  No history of TIAs angina claudication etc.    3. Old peripheral tear of medial meniscus of left knee  Had successful surgery a few months ago.        The patient's current medical problems were reviewed.    I have had an Advance Directives discussion with the patient.  The following health maintenance schedule was reviewed with the patient and provided in printed form in the after visit summary:   Health Maintenance   Topic Date Due     ZOSTER VACCINES (1 of 2) 05/21/1987     DXA SCAN  05/21/2002     INFLUENZA VACCINE RULE BASED (1) 08/01/2020     MEDICARE ANNUAL WELLNESS VISIT  09/10/2020     TD 18+ HE  07/20/2021     FALL RISK ASSESSMENT  08/05/2021     ADVANCE CARE PLANNING  09/10/2024     PNEUMOCOCCAL IMMUNIZATION 65+ LOW/MEDIUM RISK  Completed     HEPATITIS B VACCINES  Aged Out        Subjective:   Chief Complaint: Neetu Bird is an 85 y.o. female here for an Annual Wellness visit.   HPI: 85-year-old woman for annual visit and exam.  Comprehensive system review was done.  It is basically negative.  She recently underwent left knee surgery for torn meniscus and has recovered nicely.  No cardiopulmonary symptoms.  No GI  problems.  No neurologic problems such as TIAs, migraine, neuropathy, numbness  All other joints are now normal.      Review of Systems:    Please see above.  The rest of the review of systems are negative for all systems.    Patient Care Team:  Antonio Coyne MD as PCP - General (Internal Medicine)  Antonio Coyne MD as Assigned PCP     Patient Active Problem List   Diagnosis      Pure hypercholesterolemia     Renal mass, right     Ruptured ovarian cyst     Past Medical History:   Diagnosis Date     Pure hypercholesterolemia 8/29/2016     Ruptured ovarian cyst 8/29/2016      Past Surgical History:   Procedure Laterality Date     OVARY SURGERY      fibroma     TONSILLECTOMY AND ADENOIDECTOMY        Family History   Problem Relation Age of Onset     Other Mother         Passed in a MVA     Other Father         passed from old age      Social History     Socioeconomic History     Marital status:      Spouse name: Not on file     Number of children: Not on file     Years of education: Not on file     Highest education level: Not on file   Occupational History     Not on file   Social Needs     Financial resource strain: Not on file     Food insecurity     Worry: Not on file     Inability: Not on file     Transportation needs     Medical: Not on file     Non-medical: Not on file   Tobacco Use     Smoking status: Never Smoker     Smokeless tobacco: Never Used   Substance and Sexual Activity     Alcohol use: Not on file     Drug use: Not on file     Sexual activity: Not on file   Lifestyle     Physical activity     Days per week: Not on file     Minutes per session: Not on file     Stress: Not on file   Relationships     Social connections     Talks on phone: Not on file     Gets together: Not on file     Attends [employer] service: Not on file     Active member of club or organization: Not on file     Attends meetings of clubs or organizations: Not on file     Relationship status: Not on file     Intimate partner violence     Fear of current or ex partner: Not on file     Emotionally abused: Not on file     Physically abused: Not on file     Forced sexual activity: Not on file   Other Topics Concern     Not on file   Social History Narrative    , 1 son      Current Outpatient Medications   Medication Sig Dispense Refill     calcium-vitamin D (CALCIUM-VITAMIN D) 500 mg(1,250mg) -200  "unit per tablet Take 1 tablet by mouth 2 (two) times a day with meals.       ciclopirox (LOPROX, AS OLAMINE,) 0.77 % cream Gently massage into affected nail areas and surrounding skin 15 g 1     triamcinolone (KENALOG) 0.1 % ointment APPLY EXTERNALLY TO THE AFFECTED AREA TWICE DAILY 30 g 1     triamcinolone (KENALOG) 0.5 % cream Apply to rash twice daily 30 g 1     No current facility-administered medications for this visit.       Objective:   Vital Signs:   Visit Vitals  Ht 4' 8.5\" (1.435 m)   Wt 105 lb (47.6 kg)   BMI 23.13 kg/m           VisionScreening:  No exam data present     PHYSICAL EXAM blood pressure is 128/74 pulse is 84  EYES: Eyelids, conjunctiva, and sclera were normal. Pupils were normal. Cornea, iris, and lens were normal bilaterally.  HEAD, EARS, NOSE, MOUTH, AND THROAT: Head and face were normal. Hearing was normal to voice and the ears were normal to external exam. Nose appearance was normal and there was no discharge. Oropharynx was normal.  NECK: Neck appearance was normal. There were no neck masses and the thyroid was not enlarged.  No bruits.  RESPIRATORY: Breathing pattern was normal and the chest moved symmetrically.  Percussion/auscultatory percussion was normal.  Lung sounds were normal and there were no abnormal sounds.  CARDIOVASCULAR: Heart rate and rhythm were normal.  S1 and S2 were normal and there were no extra sounds or murmurs. Peripheral pulses in arms and legs were normal.  Jugular venous pressure was normal.  There was no peripheral edema.  GASTROINTESTINAL: The abdomen was normal in contour.  Bowel sounds were present.  Percussion detected no organ enlargement or tenderness.  Palpation detected no tenderness, mass, or enlarged organs.   MUSCULOSKELETAL: Skeletal configuration was normal and muscle mass was normal for age. Joint appearance was overall normal.  Bilateral bunion deformities.  Left bunion has some fluid overlying it..  It is not painful.  LYMPHATIC: There were no " enlarged nodes.  SKIN/HAIR/NAILS: Skin color was normal.  There were no skin lesions.  Hair and nails were normal.  NEUROLOGIC: The patient was alert and oriented to person, place, time, and circumstance. Speech was normal. Cranial nerves were normal. Motor strength was normal for age. The patient was normally coordinated.  PSYCHIATRIC:  Mood and affect were normal and the patient had normal recent and remote memory. The patient's judgment and insight were normal.    ADDITIONAL VITAL SIGNS: Oxygen saturation is 91%  CHEST WALL/BREASTS: Normal female with no palpable masses.  RECTAL: Not checked  GENITAL/URINARY pelvic exam not done today.        Assessment Results 9/14/2020   Activities of Daily Living No help needed   Instrumental Activities of Daily Living No help needed   Get Up and Go Score Less than 12 seconds   Mini Cog Total Score 5   Some recent data might be hidden     A Mini-Cog score of 0-2 suggests the possibility of dementia, score of 3-5 suggests no dementia    No evidence of any cognitive decline.  She is not a fall risk.    Identified Health Risks:     Patient's advanced directive was discussed and I am comfortable with the patient's wishes.

## 2021-06-12 NOTE — PROGRESS NOTES
OFFICE VISIT NOTE    Subjective:   Chief Complaint:  Follow-up (side pain)    85-year-old male complaining of some upper quadrant discomfort.  Recent CT scan shows enlarging mass suspicious for renal cell carcinoma.  Symptoms are not made worse by eating.  No relief with eating, moving her bowels, emptying her bladder. No Hematuria.    Current Outpatient Medications   Medication Sig     calcium-vitamin D (CALCIUM-VITAMIN D) 500 mg(1,250mg) -200 unit per tablet Take 1 tablet by mouth 2 (two) times a day with meals.     ciclopirox (LOPROX, AS OLAMINE,) 0.77 % cream Gently massage into affected nail areas and surrounding skin     meloxicam (MOBIC) 7.5 MG tablet TK 1 T PO D PRN     triamcinolone (KENALOG) 0.1 % ointment APPLY EXTERNALLY TO THE AFFECTED AREA TWICE DAILY     triamcinolone (KENALOG) 0.5 % cream Apply to rash twice daily       Review of Systems:  A comprehensive review of systems is negative except for the comments above    Objective:    There were no vitals taken for this visit.  GENERAL: No acute distress.  Pleasant woman no distress.  She does have slight tenderness in the right upper quadrant.  No rebound tenderness.  No palpable masses on exam.  No flank tenderness.  No peripheral edema.    Assessment & Plan   Neetu Bird is a 85 y.o. female.    Renal mass which is increasing in size.  Suspicious for neoplasm.  She already sees a neurologist.  Apparently they are contemplating radiation therapy.  Told her she should get started on this therapy as soon as possible.    Diagnoses and all orders for this visit:    Renal mass, right  Will be seen radiation therapist.  Eczema, unspecified type  Refill triamcinolone ointment  Routine general medical examination at a health care facility  -     triamcinolone (KENALOG) 0.1 % ointment; APPLY EXTERNALLY TO THE AFFECTED AREA TWICE DAILY  Dispense: 30 g; Refill: 1        Antonio Coyne MD  Transcription using voice recognition software, may contain  typographical errors.

## 2021-06-12 NOTE — PROGRESS NOTES
Assessment and Plan:       1. Routine general medical examination at a health care facility  She remains very active at age 82.  - Vitamin D, Total (25-Hydroxy)    2. Pure hypercholesterolemia  No cardiopulmonary symptoms.  - Lipid Cascade    3. Renal mass, right  Sees a urologist every 6 months.  Mass is not changed.  - HM1(CBC and Differential)  - Comprehensive Metabolic Panel  - Urinalysis-UC if Indicated  - HM1 (CBC with Diff)    4. Ruptured ovarian cyst  She had surgery for this 2 years ago.  Still has some adhesions and minor discomfort.    5. Need for vaccination  In need of Prevnar.  At a later date, she will require Zostavax.  - Pneumococcal conjugate vaccine 13-valent 6wks-17yrs; >50yrs    6 tinea of thumb nail    she can try Penlac          The patient's current medical problems were reviewed.    I have had an Advance Directives discussion with the patient.  The following health maintenance schedule was reviewed with the patient and provided in printed form in the after visit summary:   Health Maintenance   Topic Date Due     ZOSTER VACCINE  05/21/1997     DXA SCAN  05/21/2002     PNEUMOCOCCAL CONJUGATE VACCINE FOR ADULTS (PCV13 OR PREVNAR)  05/21/2002     INFLUENZA VACCINE RULE BASED (1) 08/01/2017     FALL RISK ASSESSMENT  08/29/2017     TD 18+ HE  07/20/2021     ADVANCE DIRECTIVES DISCUSSED WITH PATIENT  08/29/2021     PNEUMOCOCCAL POLYSACCHARIDE VACCINE AGE 65 AND OVER  Completed        Subjective:   Chief Complaint: Neetu Bird is an 82 y.o. female here for an Annual Wellness visit.   HPI: 82-year-old female in for annual wellness exam.  She is incredibly active for age.  She runs 5 or 6 miles 2-3 times a week.  She also swims and bikes.  She is rarely sick.  Denies chest pain with exertion.  No orthopnea  No chronic cough wheezing or asthma  No GI symptoms such as constipation blood in stool or dysphasia  No incontinence.  No stress incontinence no hematuria  No history of diabetes or polyuria.  " No thyroid disorder  Denies any significant musculoskeletal problems.  No TIAs.  No migraine.  No epilepsy.  No history of neuropathy.    Review of Systems:    Please see above.  The rest of the review of systems are negative for all systems.    Patient Care Team:  Antonio Coyne MD as PCP - General (Internal Medicine)     Patient Active Problem List   Diagnosis     Pure hypercholesterolemia     Renal mass, right     Ruptured ovarian cyst     Past Medical History:   Diagnosis Date     Elevated cholesterol     no vascular problems      Past Surgical History:   Procedure Laterality Date     OVARY SURGERY      fibroma     TONSILLECTOMY AND ADENOIDECTOMY        Family History   Problem Relation Age of Onset     Other Mother      Passed in a MVA     Other Father      passed from old age      Social History     Social History     Marital status:      Spouse name: N/A     Number of children: N/A     Years of education: N/A     Occupational History     Not on file.     Social History Main Topics     Smoking status: Never Smoker     Smokeless tobacco: Not on file     Alcohol use Not on file     Drug use: Not on file     Sexual activity: Not on file     Other Topics Concern     Not on file     Social History Narrative    , 1 son      Current Outpatient Prescriptions   Medication Sig Dispense Refill     calcium-vitamin D (CALCIUM-VITAMIN D) 500 mg(1,250mg) -200 unit per tablet Take 1 tablet by mouth 2 (two) times a day with meals.       cyanocobalamin 500 MCG tablet Take 500 mcg by mouth.       OMEGA-3/DHA/EPA/FISH OIL (FISH OIL-OMEGA-3 FATTY ACIDS) 300-1,000 mg capsule Take 2 g by mouth daily.       No current facility-administered medications for this visit.       Objective:   Vital Signs:   Visit Vitals     Pulse 87     Ht 4' 8.5\" (1.435 m)     Wt 102 lb (46.3 kg)     SpO2 97%     BMI 22.46 kg/m2        VisionScreening:  No exam data present     PHYSICAL EXAM  Pressure 114/70.  EYES: Eyelids, conjunctiva, " and sclera were normal. Pupils were normal. Cornea, iris, and lens were normal bilaterally.  HEAD, EARS, NOSE, MOUTH, AND THROAT: Head and face were normal. Hearing was normal to voice and the ears were normal to external exam. Nose appearance was normal and there was no discharge. Oropharynx was normal.  NECK: Neck appearance was normal. There were no neck masses and the thyroid was not enlarged.  No thyroid enlargement.  No bruits.  RESPIRATORY: Breathing pattern was normal and the chest moved symmetrically.  Percussion/auscultatory percussion was normal.  Lung sounds were normal and there were no abnormal sounds.  CARDIOVASCULAR: Heart rate and rhythm were normal.  S1 and S2 were normal and there were no extra sounds or murmurs. Peripheral pulses in arms and legs were normal.  Jugular venous pressure was normal.  There was no peripheral edema.  GASTROINTESTINAL: The abdomen was normal in contour.  Bowel sounds were present.  Percussion detected no organ enlargement or tenderness.  Palpation detected no tenderness, mass, or enlarged organs.  Low midline scar from previous surgery after ruptured ovarian cyst.  MUSCULOSKELETAL: Skeletal configuration was normal and muscle mass was normal for age. Joint appearance was overall normal.  Some osteoarthritic changes of the fingers and toes.  LYMPHATIC: There were no enlarged nodes.  SKIN/HAIR/NAILS: Skin color was normal.  There were no skin lesions.  Hair and nails were normal.  NEUROLOGIC: The patient was alert and oriented to person, place, time, and circumstance. Speech was normal. Cranial nerves were normal. Motor strength was normal for age. The patient was normally coordinated.  PSYCHIATRIC:  Mood and affect were normal and the patient had normal recent and remote memory. The patient's judgment and insight were normal.    ADDITIONAL VITAL SIGNS: Saturations 98%  CHEST WALL/BREASTS: Normal female.  No masses palpated.  RECTAL: Not checked.  GENITAL/URINARY: Not  checked today.        Assessment Results 8/30/2017   Activities of Daily Living No help needed   Instrumental Activities of Daily Living No help needed   Get Up and Go Score Less than 12 seconds   Mini Cog Total Score 4   Some recent data might be hidden     A Mini-Cog score of 0-2 suggests the possibility of dementia, score of 3-5 suggests no dementia    Identified Health Risks:     Patient's advanced directive was discussed and I am comfortable with the patient's wishes.

## 2021-06-13 NOTE — CONSULTS
[facility]  VASCULAR AND INTERVENTIONAL OUTPATIENT CLINIC           NEW PATIENT - VIRTUAL VISIT  12/1/2020 1:30 PM    REASON FOR CONSULT: Right anterior/mid pole renal cell carcinoma  REQUESTING PROVIDER: Dr. Nelson    HPI: Ms. Bird is a 85-year-old female seen for consultation regarding potential percutaneous renal mass cryoablation. The patient has a suspected right mid/anterior pole renal cell carcinoma seen on most recent CT dated 9/14/2020 and denies current hematuria, dysuria or flank pain. The patient has been followed by Dr. Nelson for workup and management of her renal mass. The patient does not wish to undergo any open operative intervention for her tumor at this time.    I discussed with the patient the potential risks and benefits of percutaneous cryoablation. The risks include but are not limited to significant hemorrhage, renal fracture, hematuria and/or infection. The details of the procedure were discussed with him. The immediate postprocedure symptoms of pain and nausea were also described to the patient. The patient will require post procedure imaging to ensure adequate response and may require future treatment in case of recurrence.    IMAGING FINDINGS: All imaging was personally reviewed by myself and with the patient.  CT renal mass protocol study dated 9/14/2020     IMPRESSION: 1. 2.7 cm enlarging, solid right kidney mass represents RENAL CELL CARCINOMA until proven otherwise. No evidence of metastases. 2. Normal variant accessory inferior right hepatic vein in the liver could be vulnerable to avulsion during liver retraction, if right nephrectomy is performed.    REVIEW OF SYMPTOMS: A comprehensive 10 point of symptoms was performed. All are negative except those noted in the history of present illness.    ASSESSMENT: 85-year-old female with suspected right mid/upper pole, partially exophytic, renal cell carcinoma measuring 2.7 cm on the most recent CT scan. This mass has gradually  "increased in size over the years (previously measuring 1.8 cm on 5/22/2015). Given the mid/anterior location (away from the pelvis) and tumor size (less than 4 cm) she is a candidate for CT-guided percutaneous cryoablation under moderate sedation. Review of imaging does demonstrate the tumor adjacent to colon, the gallbladder as well as the inferior margin of the right hepatic lobe. It is hopeful these structures can be displaced with aggressive hydrodissection. This was reviewed in detail with the patient. If there is no safe window during the procedure than cryoablation may not be possible. We would ensure an adequate ablation margin (of 10 mm) to achieve a cure and would be able to achieve this under CT guidance with visualization of the ablation zone \"ice ball\". The risks, benefits and alternatives to the procedure were discussed in detail with the patient. The patient was explained she would be observed for at least 4 hours post procedure for monitoring of postoperative pain and symptoms followed by likely discharge.     PLAN:  Schedule CT-guided renal cell carcinoma cryoablation under moderate sedation for March.       Maria Luz Collins MD, VI  Vascular and Interventional Radiology  Vascular Medicine  Internal Medicine  [facility]  Clinic: [phone]    "

## 2021-06-16 PROBLEM — M23.204 OLD PERIPHERAL TEAR OF MEDIAL MENISCUS OF LEFT KNEE: Chronic | Status: ACTIVE | Noted: 2020-09-14

## 2021-06-19 NOTE — LETTER
Letter by Antonio Coyne MD at      Author: Antonio Coyne MD Service: -- Author Type: --    Filed:  Encounter Date: 9/11/2019 Status: (Other)         Neetu Bird  [address]  [place] [address]             September 11, 2019         Dear Ms. Bird,    Below are the results from your recent visit:    Resulted Orders   Comprehensive Metabolic Panel   Result Value Ref Range    Sodium 137 136 - 145 mmol/L    Potassium 4.3 3.5 - 5.0 mmol/L    Chloride 101 98 - 107 mmol/L    CO2 28 22 - 31 mmol/L    Anion Gap, Calculation 8 5 - 18 mmol/L    Glucose 89 70 - 125 mg/dL    BUN 17 8 - 28 mg/dL    Creatinine 0.84 0.60 - 1.10 mg/dL    GFR MDRD Af Amer >60 >60 mL/min/1.73m2    GFR MDRD Non Af Amer >60 >60 mL/min/1.73m2    Bilirubin, Total 0.6 0.0 - 1.0 mg/dL    Calcium 9.8 8.5 - 10.5 mg/dL    Protein, Total 7.0 6.0 - 8.0 g/dL    Albumin 3.9 3.5 - 5.0 g/dL    Alkaline Phosphatase 65 45 - 120 U/L    AST 28 0 - 40 U/L    ALT 19 0 - 45 U/L    Narrative    Fasting Glucose reference range is 70-99 mg/dL per  American Diabetes Association (ADA) guidelines.   Lipid Cascade   Result Value Ref Range    Cholesterol 227 (H) <=199 mg/dL    Triglycerides 106 <=149 mg/dL    HDL Cholesterol 77 >=50 mg/dL    LDL Calculated 129 <=129 mg/dL    Patient Fasting > 8hrs? Yes    Urinalysis-UC if Indicated   Result Value Ref Range    Color, UA Yellow Colorless, Yellow, Straw, Light Yellow    Clarity, UA Clear Clear    Glucose, UA Negative Negative    Bilirubin, UA Negative Negative    Ketones, UA Negative Negative    Specific Gravity, UA 1.014 1.001 - 1.030    Blood, UA Negative Negative    pH, UA 8.0 4.5 - 8.0    Protein, UA Trace (!) Negative mg/dL    Urobilinogen, UA <2.0 E.U./dL <2.0 E.U./dL, 2.0 E.U./dL    Nitrite, UA Negative Negative    Leukocytes, UA Negative Negative    Bacteria, UA None Seen None Seen hpf    RBC, UA 0-2 None Seen, 0-2 hpf    WBC, UA 0-5 None Seen, 0-5 hpf    Squam Epithel, UA 5-10 (!) None Seen, 0-5 lpf     Mucus, UA Few (!) None Seen lpf    Narrative    UC not indicated   HM1 (CBC with Diff)   Result Value Ref Range    WBC 7.1 4.0 - 11.0 thou/uL    RBC 4.96 3.80 - 5.40 mill/uL    Hemoglobin 14.8 12.0 - 16.0 g/dL    Hematocrit 44.6 35.0 - 47.0 %    MCV 90 80 - 100 fL    MCH 29.8 27.0 - 34.0 pg    MCHC 33.2 32.0 - 36.0 g/dL    RDW 11.8 11.0 - 14.5 %    Platelets 246 140 - 440 thou/uL    MPV 9.1 7.0 - 10.0 fL    Neutrophils % 60 50 - 70 %    Lymphocytes % 30 20 - 40 %    Monocytes % 8 2 - 10 %    Eosinophils % 1 0 - 6 %    Basophils % 1 0 - 2 %    Neutrophils Absolute 4.3 2.0 - 7.7 thou/uL    Lymphocytes Absolute 2.2 0.8 - 4.4 thou/uL    Monocytes Absolute 0.6 0.0 - 0.9 thou/uL    Eosinophils Absolute 0.1 0.0 - 0.4 thou/uL    Basophils Absolute 0.0 0.0 - 0.2 thou/uL       Neetu, recent labs are reviewed.  Electrolytes, blood sugar, kidney functions, and all liver function tests, were normal.  Lipids looked okay.  HDL cholesterol, the good variety, is quite good at 77.  Your urine analysis looked okay without signs of infection or blood loss.  Your hemoglobin was excellent at 14.8.  You continue to have an excellent exam.  I continue to be impressed by your exercise regimen.  Keep it up and good luck with the races.    Please call with questions or contact us using [employer].    Sincerely,        Electronically signed by Antonio Coyne MD

## 2021-06-20 NOTE — PROGRESS NOTES
Assessment and Plan:   83-year-old woman in for annual wellness exam.  Only complaints is a minor rash under her left eye and some fungal infection of the nails.  She continues to be very active.  She jogs several times a week    1. Routine general medical examination at a health care facility    - triamcinolone (KENALOG) 0.1 % ointment; APPLY EXTERNALLY TO THE AFFECTED AREA TWICE DAILY  Dispense: 30 g; Refill: 1  - HM1(CBC and Differential)  - HM1 (CBC with Diff)  Loprox 0.77% for nail fungus  2. Pure hypercholesterolemia  No cardiovascular symptoms  - Lipid Cascade  - Electrocardiogram Perform and Read    3. Renal mass, right  Get CT scans every 6 months.  Is been no change in the last several years    4. Other eczema  Kenalog as needed    5. Ruptured ovarian cyst  No current problem    6. Healthcare maintenance  Immunizations up-to-date.  - Comprehensive Metabolic Panel  - Urinalysis-UC if Indicated    7. Right lower quadrant abdominal pain  No complaints of abdominal pain at this time.        The patient's current medical problems were reviewed.    I have had an Advance Directives discussion with the patient.  The following health maintenance schedule was reviewed with the patient and provided in printed form in the after visit summary:   Health Maintenance   Topic Date Due     ZOSTER VACCINE  05/21/1997     DXA SCAN  05/21/2002     FALL RISK ASSESSMENT  08/29/2017     INFLUENZA VACCINE RULE BASED (1) 08/01/2018     TD 18+ HE  07/20/2021     ADVANCE DIRECTIVES DISCUSSED WITH PATIENT  08/30/2022     PNEUMOCOCCAL POLYSACCHARIDE VACCINE AGE 65 AND OVER  Completed     PNEUMOCOCCAL CONJUGATE VACCINE FOR ADULTS (PCV13 OR PREVNAR)  Completed        Subjective:   Chief Complaint: Neetu Bird is an 83 y.o. female here for an Annual Wellness visit.   HPI: 83-year-old woman for annual wellness exam.  No chest pain orthopnea  No cough wheezing or asthma  No dysphasia no change in bowel habits no blood in the stool  No  "incontinence no hematuria no kidney stones.  Right renal mass as noted above.  No TIAs no epilepsy.  No neuropathy.  No diabetes or thyroid disorders.  Minor discomfort of the hands from osteoarthritis.      Review of Systems:    Please see above.  The rest of the review of systems are negative for all systems.    Patient Care Team:  Antonio Coyne MD as PCP - General (Internal Medicine)     Patient Active Problem List   Diagnosis     Pure hypercholesterolemia     Renal mass, right     Ruptured ovarian cyst     Past Medical History:   Diagnosis Date     Elevated cholesterol     no vascular problems      Past Surgical History:   Procedure Laterality Date     OVARY SURGERY      fibroma     TONSILLECTOMY AND ADENOIDECTOMY        Family History   Problem Relation Age of Onset     Other Mother      Passed in a MVA     Other Father      passed from old age      Social History     Social History     Marital status:      Spouse name: N/A     Number of children: N/A     Years of education: N/A     Occupational History     Not on file.     Social History Main Topics     Smoking status: Never Smoker     Smokeless tobacco: Never Used     Alcohol use Not on file     Drug use: Not on file     Sexual activity: Not on file     Other Topics Concern     Not on file     Social History Narrative    , 1 son      Current Outpatient Prescriptions   Medication Sig Dispense Refill     calcium-vitamin D (CALCIUM-VITAMIN D) 500 mg(1,250mg) -200 unit per tablet Take 1 tablet by mouth 2 (two) times a day with meals.       triamcinolone (KENALOG) 0.1 % ointment APPLY EXTERNALLY TO THE AFFECTED AREA TWICE DAILY 15 g 0     No current facility-administered medications for this visit.       Objective:   Vital Signs:   Visit Vitals     Ht 4' 8.5\" (1.435 m)     Wt 101 lb (45.8 kg)     BMI 22.24 kg/m2        VisionScreening:  No exam data present     PHYSICAL EXAM  Small woman who is in no distress.  Blood pressures 160/70 pulse " 64  EYES: Eyelids, conjunctiva, and sclera were normal. Pupils were normal. Cornea, iris, and lens were normal bilaterally.  HEAD, EARS, NOSE, MOUTH, AND THROAT: Head and face were normal. Hearing was normal to voice and the ears were normal to external exam. Nose appearance was normal and there was no discharge. Oropharynx was normal.  NECK: Neck appearance was normal. There were no neck masses and the thyroid was not enlarged.  RESPIRATORY: Breathing pattern was normal and the chest moved symmetrically.  Percussion/auscultatory percussion was normal.  Lung sounds were normal and there were no abnormal sounds.  CARDIOVASCULAR: Heart rate and rhythm were normal.  S1 and S2 were normal and there were no extra sounds or murmurs. Peripheral pulses in arms and legs were normal.  Jugular venous pressure was normal.  There was no peripheral edema.  GASTROINTESTINAL: The abdomen was normal in contour.  Bowel sounds were present.  Percussion detected no organ enlargement or tenderness.  Palpation detected no tenderness, mass, or enlarged organs.   MUSCULOSKELETAL: Skeletal configuration was normal and muscle mass was normal for age. Joint appearance was overall normal.  LYMPHATIC: There were no enlarged nodes.  SKIN/HAIR/NAILS: Skin color was normal.  There were no skin lesions.  Hair and nails were normal.  NEUROLOGIC: The patient was alert and oriented to person, place, time, and circumstance. Speech was normal. Cranial nerves were normal. Motor strength was normal for age. The patient was normally coordinated.  PSYCHIATRIC:  Mood and affect were normal and the patient had normal recent and remote memory. The patient's judgment and insight were normal.    ADDITIONAL VITAL SIGNS: Pulse 64  CHEST WALL/BREASTS: No masses  RECTAL: Not checked  GENITAL/URINARY: Gynecology does this per        Assessment Results 9/4/2018   Activities of Daily Living No help needed   Instrumental Activities of Daily Living No help needed   Get  Up and Go Score Less than 12 seconds   Mini Cog Total Score 4   Some recent data might be hidden     A Mini-Cog score of 0-2 suggests the possibility of dementia, score of 3-5 suggests no dementia    Identified Health Risks:     Patient's advanced directive was discussed and I am comfortable with the patient's wishes.

## 2021-06-20 NOTE — LETTER
Letter by Antonio Coyne MD at      Author: Antonio Coyne MD Service: -- Author Type: --    Filed:  Encounter Date: 9/15/2020 Status: (Other)         Neetu Bird  [address] [facility] [place]  [place] [address]             September 15, 2020         Dear Ms. Bird,    Below are the results from your recent visit:    Resulted Orders   Comprehensive Metabolic Panel   Result Value Ref Range    Sodium 135 (L) 136 - 145 mmol/L    Potassium 4.5 3.5 - 5.0 mmol/L    Chloride 101 98 - 107 mmol/L    CO2 27 22 - 31 mmol/L    Anion Gap, Calculation 7 5 - 18 mmol/L    Glucose 93 70 - 125 mg/dL    BUN 19 8 - 28 mg/dL    Creatinine 0.75 0.60 - 1.10 mg/dL    GFR MDRD Af Amer >60 >60 mL/min/1.73m2    GFR MDRD Non Af Amer >60 >60 mL/min/1.73m2    Bilirubin, Total 0.6 0.0 - 1.0 mg/dL    Calcium 9.8 8.5 - 10.5 mg/dL    Protein, Total 6.5 6.0 - 8.0 g/dL    Albumin 3.8 3.5 - 5.0 g/dL    Alkaline Phosphatase 64 45 - 120 U/L    AST 26 0 - 40 U/L    ALT 29 0 - 45 U/L    Narrative    Fasting Glucose reference range is 70-99 mg/dL per  American Diabetes Association (ADA) guidelines.   Lipid Cascade   Result Value Ref Range    Cholesterol 243 (H) <=199 mg/dL    Triglycerides 91 <=149 mg/dL    HDL Cholesterol 75 >=50 mg/dL    LDL Calculated 150 (H) <=129 mg/dL    Patient Fasting > 8hrs? Yes    Urinalysis-UC if Indicated   Result Value Ref Range    Color, UA Yellow Colorless, Yellow, Straw, Light Yellow    Clarity, UA Clear Clear    Glucose, UA Negative Negative    Bilirubin, UA Negative Negative    Ketones, UA Negative Negative    Specific Gravity, UA 1.020 1.005 - 1.030    Blood, UA Negative Negative    pH, UA 7.0 5.0 - 8.0    Protein, UA Negative Negative mg/dL    Urobilinogen, UA 0.2 E.U./dL 0.2 E.U./dL, 1.0 E.U./dL    Nitrite, UA Negative Negative    Leukocytes, UA Negative Negative    Narrative    Microscopic not indicated  UC not indicated   HM1 (CBC with Diff)   Result Value Ref Range    WBC 7.5 4.0 - 11.0 thou/uL    RBC 4.58 3.80 -  5.40 mill/uL    Hemoglobin 13.6 12.0 - 16.0 g/dL    Hematocrit 40.7 35.0 - 47.0 %    MCV 89 80 - 100 fL    MCH 29.7 27.0 - 34.0 pg    MCHC 33.4 32.0 - 36.0 g/dL    RDW 12.3 11.0 - 14.5 %    Platelets 275 140 - 440 thou/uL    MPV 7.9 7.0 - 10.0 fL    Neutrophils % 65 50 - 70 %    Lymphocytes % 26 20 - 40 %    Monocytes % 8 2 - 10 %    Eosinophils % 1 0 - 6 %    Basophils % 1 0 - 2 %    Neutrophils Absolute 4.9 2.0 - 7.7 thou/uL    Lymphocytes Absolute 2.0 0.8 - 4.4 thou/uL    Monocytes Absolute 0.6 0.0 - 0.9 thou/uL    Eosinophils Absolute 0.1 0.0 - 0.4 thou/uL    Basophils Absolute 0.0 0.0 - 0.2 thou/uL       Neetu, recent labs are reviewed.  Your CBC was normal with normal hemoglobin and white blood count.  Urinalysis was okay without infections or any signs of blood loss.  Cholesterol level was a little high but your good cholesterol, HDL, remains in a very good range so I would not worry about your lipids.  Chemistry survey looks normal with normal electrolytes, blood sugar, kidney functions, and liver function tests.  The minimally decrease in sodium level is not significant for you.  Is actually better than it was a year ago.  You continue to be very healthy.  Your exam was excellent.  Currently, you are my only patient in her 80s who runs for 5 miles several times a week.  Keep up the fantastic work.    Please call with questions or contact us using [employer].    Sincerely,        Electronically signed by Antonio Coyne MD

## 2021-08-05 ENCOUNTER — TRANSFERRED RECORDS (OUTPATIENT)
Dept: HEALTH INFORMATION MANAGEMENT | Facility: CLINIC | Age: 86
End: 2021-08-05
Payer: COMMERCIAL

## 2021-08-05 PROCEDURE — 88304 TISSUE EXAM BY PATHOLOGIST: CPT | Mod: TC,ORL | Performed by: ORTHOPAEDIC SURGERY

## 2021-08-06 ENCOUNTER — LAB REQUISITION (OUTPATIENT)
Dept: LAB | Facility: CLINIC | Age: 86
End: 2021-08-06
Payer: COMMERCIAL

## 2021-08-09 LAB
PATH REPORT.COMMENTS IMP SPEC: NORMAL
PATH REPORT.COMMENTS IMP SPEC: NORMAL
PATH REPORT.FINAL DX SPEC: NORMAL
PATH REPORT.GROSS SPEC: NORMAL
PATH REPORT.MICROSCOPIC SPEC OTHER STN: NORMAL
PATH REPORT.RELEVANT HX SPEC: NORMAL
PHOTO IMAGE: NORMAL

## 2021-08-09 PROCEDURE — 88304 TISSUE EXAM BY PATHOLOGIST: CPT | Mod: 26 | Performed by: PATHOLOGY

## 2021-09-14 ENCOUNTER — DOCUMENTATION ONLY (OUTPATIENT)
Dept: INTERVENTIONAL RADIOLOGY/VASCULAR | Facility: HOSPITAL | Age: 86
End: 2021-09-14

## 2021-09-18 NOTE — PROGRESS NOTES
[facility]  VASCULAR AND INTERVENTIONAL OUTPATIENT CLINIC           ESTABLISHED PATIENT   09/14/2021 1:30 PM    REASON FOR VISIT: Right anterior/mid pole renal cell carcinoma    HPI: Ms. Bird is a 86-year-old female seen for consultation regarding potential percutaneous renal mass cryoablation. The patient has a suspected right mid/anterior pole renal cell carcinoma seen on most recent CT dated 06/03/2021. She was initially seen virtually on 12/01/2020 and at that time the decision was made to an attempt cryoablation in March of this year. The patient now presents for follow-up and would like to review her imaging in detail in the clinic given the extremely difficult location of her mass. She denies current hematuria, dysuria or flank pain. The patient has been followed by Dr. Nelson for workup and management of her renal mass. The patient does not wish to undergo any open operative intervention for her tumor at this time.    I discussed with the patient the potential risks and benefits of percutaneous cryoablation. The risks include but are not limited to significant hemorrhage, renal fracture, hematuria and/or infection. The details of the procedure were discussed with him. The immediate postprocedure symptoms of pain and nausea were also described to the patient. The patient will require post procedure imaging to ensure adequate response and may require future treatment in case of recurrence.    IMAGING FINDINGS: All imaging was personally reviewed by myself and with the patient.  CT renal mass protocol study dated 06/03/2021     IMPRESSION:   Slight increase in size of a solid 2.6 cm right renal mass. This is most consistent with renal cell carcinoma.    REVIEW OF SYMPTOMS: A comprehensive 10 point of symptoms was performed. All are negative except those noted in the history of present illness.    ASSESSMENT: 86-year-old female with suspected right mid/upper pole, partially exophytic, renal cell carcinoma  measuring 2.6 cm on the most recent CT scan dated 06/03/2021. This is similar in size to her previous study dated 09/14/2020. This mass has gradually increased in size over the years (previously measuring 1.8 cm on 5/22/2015).     Review of imaging does demonstrate the tumor adjacent to colon, gallbladder as well as the inferior margin of the right hepatic lobe. It would be extremely difficult to safely ablate this lesion in its current configuration given high risk of injury to the adjacent colon and gallbladder. It may be difficult to utilize hydrodissection given the colon and gallbladder are in contact with the tumor. The imaging was reviewed in detail with the patient in the clinic. At this point, the tumor is similar in size when compared to last year's scan. Given her age, it is hopeful this lesion will not increase in size significantly over the next few years. It is reasonable to obtain a one year follow-up CT renal mass spelled protocol scan to monitor growth as well as if there is any change in the orientation of the adjacent structures.      Maria Luz Collins MD, [facility]  Vascular and Interventional Radiology  Vascular Medicine  Internal Medicine  [facility]  Clinic: [phone]        45 minutes was spent in total during this in person visit including both face-to-face and non face-to-face time .

## 2021-12-13 ENCOUNTER — IMMUNIZATION (OUTPATIENT)
Dept: NURSING | Facility: CLINIC | Age: 86
End: 2021-12-13
Payer: COMMERCIAL

## 2021-12-13 PROCEDURE — 91300 PR COVID VAC PFIZER DIL RECON 30 MCG/0.3 ML IM: CPT

## 2021-12-13 PROCEDURE — 0004A PR COVID VAC PFIZER DIL RECON 30 MCG/0.3 ML IM: CPT

## 2022-06-13 ENCOUNTER — IMMUNIZATION (OUTPATIENT)
Dept: NURSING | Facility: CLINIC | Age: 87
End: 2022-06-13
Payer: COMMERCIAL

## 2022-06-13 PROCEDURE — 0054A COVID-19,PF,PFIZER (12+ YRS): CPT

## 2022-06-13 PROCEDURE — 91305 COVID-19,PF,PFIZER (12+ YRS): CPT

## 2022-12-07 ENCOUNTER — IMMUNIZATION (OUTPATIENT)
Dept: NURSING | Facility: CLINIC | Age: 87
End: 2022-12-07
Payer: COMMERCIAL

## 2022-12-07 PROCEDURE — 90471 IMMUNIZATION ADMIN: CPT

## 2022-12-07 PROCEDURE — 90662 IIV NO PRSV INCREASED AG IM: CPT

## 2022-12-07 PROCEDURE — 91312 COVID-19 VACCINE BIVALENT BOOSTER 12+ (PFIZER): CPT

## 2022-12-07 PROCEDURE — 0124A COVID-19 VACCINE BIVALENT BOOSTER 12+ (PFIZER): CPT
